# Patient Record
Sex: FEMALE | Race: WHITE | URBAN - METROPOLITAN AREA
[De-identification: names, ages, dates, MRNs, and addresses within clinical notes are randomized per-mention and may not be internally consistent; named-entity substitution may affect disease eponyms.]

---

## 2019-11-25 ENCOUNTER — OTHER (OUTPATIENT)
Dept: URBAN - METROPOLITAN AREA CLINIC 18 | Facility: CLINIC | Age: 74
Setting detail: DERMATOLOGY
End: 2019-11-25

## 2019-11-25 DIAGNOSIS — L57.0 ACTINIC KERATOSIS: ICD-10-CM

## 2019-11-25 PROBLEM — L82.1 OTHER SEBORRHEIC KERATOSIS: Status: RESOLVED | Noted: 2019-11-25

## 2019-11-25 PROCEDURE — 99203 OFFICE O/P NEW LOW 30 MIN: CPT

## 2019-11-25 RX ORDER — CLOBETASOL PROPIONATE 0.5 MG/G
A SMALL AMOUNT CREAM TOPICAL ONCE A DAY
Qty: 45 | Refills: 0
Start: 2019-11-25

## 2019-11-25 RX ORDER — METHOTREXATE 2.5 MG/1
3 TABLET TABLET ORAL
Qty: 12 | Refills: 0
Start: 2019-11-25

## 2019-12-17 ENCOUNTER — FOLLOW-UP (OUTPATIENT)
Dept: URBAN - METROPOLITAN AREA CLINIC 18 | Facility: CLINIC | Age: 74
Setting detail: DERMATOLOGY
End: 2019-12-17

## 2019-12-17 DIAGNOSIS — L57.0 ACTINIC KERATOSIS: ICD-10-CM

## 2019-12-17 PROCEDURE — 99213 OFFICE O/P EST LOW 20 MIN: CPT

## 2019-12-17 RX ORDER — METHOTREXATE 2.5 MG/1
3 TABLET TABLET ORAL
Qty: 12 | Refills: 2
Start: 2019-12-17

## 2020-02-18 ENCOUNTER — RX ONLY (RX ONLY)
Age: 75
End: 2020-02-18

## 2020-02-18 RX ORDER — CLOBETASOL PROPIONATE 0.5 MG/G
CREAM TOPICAL
Qty: 45 | Refills: 0
Start: 2020-02-18

## 2023-03-10 NOTE — PROCEDURE: ADDITIONAL NOTES
Additional Notes: Discussed systemic tx ie Biologics, Otezla, MTX (been on in the past - some helpful, but didn't want liver problems). Otezla paperwork given today. Continue clobetasol only 2x week if needed. Discouraged long term daily use as can thin the skin. Pt has refill by PCP but adv to use no more than 2 wks out of a month on the same area of skin
Detail Level: Simple
Render Risk Assessment In Note?: no

## 2023-03-10 NOTE — PROCEDURE: PRESCRIPTION MEDICATION MANAGEMENT
Continue Regimen: Clobetasol only prn
Samples Given: Vtama qd and zyorve qd
Initiate Treatment: Otezla (once insurance PA done)
Detail Level: Zone
Plan: Use Vtama on one hand. Use zyorve on other. Once daily on each hand. Limit clobetasol use, await Otezla approval.
Render In Strict Bullet Format?: No

## 2023-06-14 NOTE — PROCEDURE: ADDITIONAL NOTES
Detail Level: Simple
Additional Notes: Patient has tried and failed Methotrexate for over 2 years . It helped some but has flared , possible abnormal lft’s w MTX has been off for 2 years . Has used clobetasol ointment for 1 year and wasn’t helpful. Samples of Vtama and zoryve also weren’t helpful. Winter months , heat and stress all worsen symptoms. Joint pain and swelling are problematic for patient too. Otezla paperwork and samples today. Recheck 1mo, hope to see if Otezla PA done by that and see if Otezla is tolerable for pt. If SE not tolerable will need to try biologic possibly Skyrizi
Render Risk Assessment In Note?: no

## 2023-07-26 NOTE — PROCEDURE: ADDITIONAL NOTES
Detail Level: Simple
Additional Notes: Karen Cheema spoke with pt and hope to get financial assistance for Otezla.
Render Risk Assessment In Note?: no

## 2023-07-26 NOTE — PROCEDURE: PRESCRIPTION MEDICATION MANAGEMENT
Render In Strict Bullet Format?: No
Samples Given: Ab lane to AA, otezla
Detail Level: Zone
Continue Regimen: Otezla and clobetasol prn flares only
Plan: Hope pt can get monetary assistance for Otezla lai since this is helping and pt tolerating well w/o GI symptoms, no weight loss, and no sx of depression.

## 2023-10-26 NOTE — PROCEDURE: PRESCRIPTION MEDICATION MANAGEMENT
Detail Level: Zone
Continue Regimen: Clobetasol cream 2-3 times per week, zoryve daily - will send rx to [facility] see if better pricing. Has helped. Otezla stopped due to insurance not covering meds.
Render In Strict Bullet Format?: No

## 2023-10-26 NOTE — PROCEDURE: ADDITIONAL NOTES
Render Risk Assessment In Note?: no
Additional Notes: Consider Ilumya if hands worsening.
Detail Level: Simple